# Patient Record
Sex: FEMALE | Race: BLACK OR AFRICAN AMERICAN | Employment: FULL TIME | URBAN - METROPOLITAN AREA
[De-identification: names, ages, dates, MRNs, and addresses within clinical notes are randomized per-mention and may not be internally consistent; named-entity substitution may affect disease eponyms.]

---
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
## 2020-01-21 ENCOUNTER — HOSPITAL ENCOUNTER (EMERGENCY)
Age: 20
Discharge: HOME | End: 2020-01-21
Attending: EMERGENCY MEDICINE
Payer: MEDICAID

## 2020-01-21 VITALS
TEMPERATURE: 98.2 F | DIASTOLIC BLOOD PRESSURE: 61 MMHG | RESPIRATION RATE: 16 BRPM | OXYGEN SATURATION: 100 % | SYSTOLIC BLOOD PRESSURE: 118 MMHG | HEART RATE: 60 BPM

## 2020-01-21 DIAGNOSIS — N39.0 ACUTE UTI: Primary | ICD-10-CM

## 2020-01-21 LAB
APPEARANCE UR: ABNORMAL
BACTERIA URNS QL MICRO: ABNORMAL /HPF
BILIRUB UR QL: NEGATIVE
COLOR UR: ABNORMAL
EPITH CASTS URNS QL MICRO: ABNORMAL /LPF (ref 0–5)
GLUCOSE UR STRIP.AUTO-MCNC: NEGATIVE MG/DL
HCG UR QL: NEGATIVE
HGB UR QL STRIP: NEGATIVE
KETONES UR QL STRIP.AUTO: NEGATIVE MG/DL
LEUKOCYTE ESTERASE UR QL STRIP.AUTO: ABNORMAL
NITRITE UR QL STRIP.AUTO: NEGATIVE
PH UR STRIP: 5 [PH] (ref 5–8)
PROT UR STRIP-MCNC: NEGATIVE MG/DL
RBC #/AREA URNS HPF: ABNORMAL /HPF (ref 0–5)
SP GR UR REFRACTOMETRY: 1.03 (ref 1–1.03)
UROBILINOGEN UR QL STRIP.AUTO: 0.2 EU/DL (ref 0.2–1)
WBC URNS QL MICRO: ABNORMAL /HPF (ref 0–4)

## 2020-01-21 PROCEDURE — 81025 URINE PREGNANCY TEST: CPT

## 2020-01-21 PROCEDURE — 99281 EMR DPT VST MAYX REQ PHY/QHP: CPT

## 2020-01-21 PROCEDURE — 81001 URINALYSIS AUTO W/SCOPE: CPT

## 2020-01-21 RX ORDER — CEPHALEXIN 500 MG/1
500 CAPSULE ORAL 4 TIMES DAILY
Qty: 28 CAP | Refills: 0 | Status: SHIPPED | OUTPATIENT
Start: 2020-01-21 | End: 2020-01-28

## 2020-01-21 NOTE — ED PROVIDER NOTES
EMERGENCY DEPARTMENT HISTORY AND PHYSICAL EXAM    Date: 1/21/2020  Patient Name: Carrie Aggarwal    History of Presenting Illness     Chief Complaint   Patient presents with    Abdominal Pain    Vomiting    Diarrhea         History Provided By: Patient and boyfriend  Additional History (Context): Carrie Aggarwal is a 23 y.o. female with No significant past medical history who presents with n/v/d and lower abd pain x 2-3d. Denies hector hematemesis, melena, hematochezia, h/o pancreatitis, cirrhosis, hepatitis, HIV. Denies prior abd surgeries. LNMP 12/2. Not using contraception. Hasn't taken a home pregnancy test.  Denies flank pain, dysuria, fever. Pt says she has a h/o ovarian cysts. PCP: None        Past History     Past Medical History:  No past medical history on file. Past Surgical History:  No past surgical history on file. Family History:  No family history on file. Social History:  Social History     Tobacco Use    Smoking status: Never Smoker    Smokeless tobacco: Never Used   Substance Use Topics    Alcohol use: Yes    Drug use: No       Allergies:  No Known Allergies      Review of Systems   Review of Systems   Constitutional: Negative for fever. Gastrointestinal: Positive for abdominal pain, diarrhea, nausea and vomiting. Negative for blood in stool. Genitourinary: Negative for flank pain. All Other Systems Negative  Physical Exam     Vitals:    01/21/20 1139   BP: 118/61   Pulse: 60   Resp: 16   Temp: 98.2 °F (36.8 °C)   SpO2: 100%     Physical Exam  Vitals signs and nursing note reviewed. Constitutional:       Appearance: She is well-developed. HENT:      Head: Normocephalic and atraumatic. Eyes:      Pupils: Pupils are equal, round, and reactive to light. Neck:      Thyroid: No thyromegaly. Vascular: No JVD. Trachea: No tracheal deviation. Cardiovascular:      Rate and Rhythm: Normal rate and regular rhythm. Heart sounds: Normal heart sounds.  No murmur. No friction rub. No gallop. Pulmonary:      Effort: Pulmonary effort is normal. No respiratory distress. Breath sounds: Normal breath sounds. No stridor. No wheezing or rales. Chest:      Chest wall: No tenderness. Abdominal:      General: Bowel sounds are normal. There is no distension. Palpations: Abdomen is soft. There is no mass. Tenderness: There is no tenderness. There is no guarding or rebound. Comments: Diffuse lower abd TTP   Musculoskeletal:         General: No tenderness. Lymphadenopathy:      Cervical: No cervical adenopathy. Skin:     General: Skin is warm and dry. Coloration: Skin is not pale. Findings: No erythema or rash. Neurological:      Mental Status: She is alert and oriented to person, place, and time. Psychiatric:         Behavior: Behavior normal.         Thought Content:  Thought content normal.            Diagnostic Study Results     Labs -     Recent Results (from the past 12 hour(s))   URINALYSIS W/ RFLX MICROSCOPIC    Collection Time: 01/21/20 12:20 PM   Result Value Ref Range    Color DARK YELLOW      Appearance TURBID      Specific gravity 1.028 1.005 - 1.030      pH (UA) 5.0 5.0 - 8.0      Protein NEGATIVE  NEG mg/dL    Glucose NEGATIVE  NEG mg/dL    Ketone NEGATIVE  NEG mg/dL    Bilirubin NEGATIVE  NEG      Blood NEGATIVE  NEG      Urobilinogen 0.2 0.2 - 1.0 EU/dL    Nitrites NEGATIVE  NEG      Leukocyte Esterase MODERATE (A) NEG     HCG URINE, QL    Collection Time: 01/21/20 12:20 PM   Result Value Ref Range    HCG urine, QL NEGATIVE  NEG     URINE MICROSCOPIC ONLY    Collection Time: 01/21/20 12:20 PM   Result Value Ref Range    WBC 30 to 40 0 - 4 /hpf    RBC 1 to 3 0 - 5 /hpf    Epithelial cells 3+ 0 - 5 /lpf    Bacteria 3+ (A) NEG /hpf       Radiologic Studies -   No orders to display     CT Results  (Last 48 hours)    None        CXR Results  (Last 48 hours)    None            Medical Decision Making   I am the first provider for this patient. I reviewed the vital signs, available nursing notes, past medical history, past surgical history, family history and social history. Vital Signs-Reviewed the patient's vital signs. Records Reviewed: Nursing Notes    Procedures:  Procedures    Provider Notes (Medical Decision Making): check for pregnancy. treat her UTI; she has ovarian cyst pain per patient. Not vomiting or in any distress here. Offered IVf and blood labs; pt prefers ABX txmt for her UTI. MED RECONCILIATION:  No current facility-administered medications for this encounter. Current Outpatient Medications   Medication Sig    cephALEXin (KEFLEX) 500 mg capsule Take 1 Cap by mouth four (4) times daily for 7 days. Disposition:  home    DISCHARGE NOTE:   1:31 PM    Pt has been reexamined. Patient has no new complaints, changes, or physical findings. Care plan outlined and precautions discussed. Results of labs were reviewed with the patient. All medications were reviewed with the patient; will d/c home with keflex. All of pt's questions and concerns were addressed. Patient was instructed and agrees to follow up with PCP, as well as to return to the ED upon further deterioration. Patient is ready to go home. Follow-up Information     Follow up With Specialties Details Why [address] [facility]  Schedule an appointment as soon as possible for a visit in 1 day  [place]  [id number]  [phone]    [address] [facility] EMERGENCY DEPT Emergency Medicine  If symptoms worsen return immediately [address]  [phone]          Current Discharge Medication List      START taking these medications    Details   cephALEXin (KEFLEX) 500 mg capsule Take 1 Cap by mouth four (4) times daily for 7 days. Qty: 28 Cap, Refills: 0             Diagnosis     Clinical Impression:   1.  Acute UTI

## 2020-01-21 NOTE — DISCHARGE INSTRUCTIONS

## 2020-01-21 NOTE — ED NOTES
I performed a brief evaluation, including history and physical, of the patient here in triage and I have determined that pt will need further treatment and evaluation from the main side ER physician. I have placed initial orders to help in expediting patients care.      January 22, 2020 at 3:10 PM - Nazanin Aguirre PA-C        Visit Vitals  /61 (BP 1 Location: Left arm, BP Patient Position: At rest)   Pulse 60   Temp 98.2 °F (36.8 °C)   Resp 16   SpO2 100%